# Patient Record
Sex: FEMALE
[De-identification: names, ages, dates, MRNs, and addresses within clinical notes are randomized per-mention and may not be internally consistent; named-entity substitution may affect disease eponyms.]

---

## 2023-01-25 ENCOUNTER — HOSPITAL ENCOUNTER (EMERGENCY)
Dept: HOSPITAL 97 - ER | Age: 23
Discharge: HOME | End: 2023-01-25
Payer: SELF-PAY

## 2023-01-25 VITALS — TEMPERATURE: 98.6 F | OXYGEN SATURATION: 100 % | SYSTOLIC BLOOD PRESSURE: 122 MMHG | DIASTOLIC BLOOD PRESSURE: 63 MMHG

## 2023-01-25 DIAGNOSIS — R10.30: Primary | ICD-10-CM

## 2023-01-25 DIAGNOSIS — R11.2: ICD-10-CM

## 2023-01-25 LAB
ALBUMIN SERPL BCP-MCNC: 3.5 G/DL (ref 3.4–5)
ALP SERPL-CCNC: 60 U/L (ref 45–117)
ALT SERPL W P-5'-P-CCNC: 20 U/L (ref 13–56)
AST SERPL W P-5'-P-CCNC: 14 U/L (ref 15–37)
BUN BLD-MCNC: 13 MG/DL (ref 7–18)
GLUCOSE SERPLBLD-MCNC: 103 MG/DL (ref 74–106)
HCT VFR BLD CALC: 35.3 % (ref 36–45)
LIPASE SERPL-CCNC: 145 U/L (ref 73–393)
LYMPHOCYTES # SPEC AUTO: 1.7 K/UL (ref 0.7–4.9)
MCV RBC: 82.4 FL (ref 80–100)
PMV BLD: 8.1 FL (ref 7.6–11.3)
POTASSIUM SERPL-SCNC: 3.9 MMOL/L (ref 3.5–5.1)
RBC # BLD: 4.28 M/UL (ref 3.86–4.86)
SP GR UR: >1.03 (ref 1–1.03)

## 2023-01-25 PROCEDURE — 81003 URINALYSIS AUTO W/O SCOPE: CPT

## 2023-01-25 PROCEDURE — 81025 URINE PREGNANCY TEST: CPT

## 2023-01-25 PROCEDURE — 80053 COMPREHEN METABOLIC PANEL: CPT

## 2023-01-25 PROCEDURE — 85025 COMPLETE CBC W/AUTO DIFF WBC: CPT

## 2023-01-25 PROCEDURE — 36415 COLL VENOUS BLD VENIPUNCTURE: CPT

## 2023-01-25 PROCEDURE — 83690 ASSAY OF LIPASE: CPT

## 2023-01-25 PROCEDURE — 76830 TRANSVAGINAL US NON-OB: CPT

## 2023-01-25 PROCEDURE — 74177 CT ABD & PELVIS W/CONTRAST: CPT

## 2023-01-25 NOTE — ER
Nurse's Notes                                                                                     

 [facility]                                                                 

Name: Alanis Burns                                                                              

Age: 22 yrs                                                                                       

Sex: Female                                                                                       

: 2000                                                                                   

MRN: [id number]                                                                                   

Arrival Date: 2023                                                                          

Time: 09:12                                                                                       

Account#: [id number]                                                                            

Bed IW1                                                                                           

Private MD:                                                                                       

Diagnosis: Lower abdominal pain, unspecified                                                      

                                                                                                  

Presentation:                                                                                     

                                                                                             

09:40 Chief complaint: Patient states: lower abd pain and vomiting since last night.          iw  

      Coronavirus screen: At this time, the client does not indicate any symptoms associated      

      with coronavirus-19. Ebola Screen: Patient negative for fever greater than or equal to      

      101.5 degrees Fahrenheit, and additional compatible Ebola Virus Disease symptoms            

      Patient denies exposure to infectious person. Patient denies travel to an                   

      Ebola-affected area in the 21 days before illness onset. No symptoms or risks               

      identified at this time. Initial Sepsis Screen: Does the patient meet any 2 criteria?       

      No. Patient's initial sepsis screen is negative. Does the patient have a suspected          

      source of infection? No. Patient's initial sepsis screen is negative. Risk Assessment:      

      Do you want to hurt yourself or someone else? Patient reports no desire to harm self or     

      others. Onset of symptoms was 2023.                                             

09:40 Method Of Arrival: Ambulatory                                                           iw  

09:40 Acuity: MIKHAIL 3                                                                           iw  

                                                                                                  

OB/GYN:                                                                                           

09:41 LMP 2022                                                                          iw  

                                                                                                  

Historical:                                                                                       

- Allergies:                                                                                      

09:41 No Known Allergies;                                                                     iw  

- Home Meds:                                                                                      

09:41 None [Active];                                                                          iw  

- PMHx:                                                                                           

09:41 None;                                                                                   iw  

                                                                                                  

                                                                                                  

                                                                                                  

Assessment:                                                                                       

12:36 Reassessment: pt not in lobby.                                                          iw  

                                                                                                  

Vital Signs:                                                                                      

09:40  / 63; Pulse 84; Resp 16; Temp 98.6; Pulse Ox 100% on R/A; Weight 63.5 kg; Height iw  

      5 ft. (152.40 cm);                                                                          

09:40 Body Mass Index 27.34 (63.50 kg, 152.40 cm)                                             iw  

                                                                                                  

ED Course:                                                                                        

09:12 Patient arrived in ED.                                                                  mr  

09:16 Jorge Ross PA is PHCP.                                                              m 

09:16 Jakub Kang MD is Attending Physician.                                            jmm 

09:41 Triage completed.                                                                       iw  

09:50 Inserted saline lock: 22 gauge in right antecubital area, using aseptic technique.      iw  

10:51 CT Abd/Pelvis - IV Contrast Only In Process Unspecified.                                EDMS

11:38 US Transvaginal Study (Probe) In Process Unspecified.                                   EDMS

14:01 Shirlene Mueller, RN is Primary Nurse.                                                   iw  

                                                                                                  

Administered Medications:                                                                         

10:03 Drug: Zofran (Ondansetron) 4 mg Route: IVP; Site: left antecubital;                     jl7 

                                                                                                  

                                                                                                  

Outcome:                                                                                          

13:59 Discharge ordered by MD.                                                                jose carlos 

14:01 Patient left the ED.                                                                    iw  

                                                                                                  

Signatures:                                                                                       

Dispatcher [employer]                           EDMS                                                 

Jorge Ross PA                       PA   [facility]                                                  

Victoria Restrepo                                 mr                                                   

Shirlene Mueller, RN                     RN   Danny Turner RN                        RN   jl7                                                  

                                                                                                  

**************************************************************************************************

## 2023-01-25 NOTE — EDPHYS
Physician Documentation                                                                           

 [facility]                                                                 

Name: Alanis Burns                                                                              

Age: 22 yrs                                                                                       

Sex: Female                                                                                       

: 2000                                                                                   

MRN: [id number]                                                                                   

Arrival Date: 2023                                                                          

Time: 09:12                                                                                       

Account#: [id number]                                                                            

Bed IW1                                                                                           

Private MD:                                                                                       

ED Physician Jakub Kang                                                                     

HPI:                                                                                              

                                                                                             

09:41 This 22 yrs old  Female presents to ER via Ambulatory with complaints of        jmm 

      Abdominal Pain, Vomiting.                                                                   

09:41 The patient presents with abdominal pain. Onset: The symptoms/episode began/occurred    jmm 

      gradually, yesterday. The symptoms do not radiate. Associated signs and symptoms:           

      Pertinent positives: nausea and vomiting. The symptoms are described as achy, sharp.        

      Modifying factors: The symptoms are alleviated by nothing, the symptoms are aggravated      

      by nothing. The patient has not experienced similar symptoms in the past. Is a              

      22-year-old female with no chronic medical conditions the presents emerged department       

      with complaints of lower abdominal pain and vomiting. Denies any diarrhea. Denies           

      fever. Denies any recent antibiotic use. Denies any recent travel..                         

                                                                                                  

OB/GYN:                                                                                           

09:41 LMP 2022                                                                          iw  

                                                                                                  

Historical:                                                                                       

- Allergies:                                                                                      

09:41 No Known Allergies;                                                                     iw  

- Home Meds:                                                                                      

09:41 None [Active];                                                                          iw  

- PMHx:                                                                                           

09:41 None;                                                                                   iw  

                                                                                                  

                                                                                                  

                                                                                                  

ROS:                                                                                              

09:41 Constitutional: Negative for fever, chills, and weight loss, Cardiovascular: Negative   jmm 

      for chest pain, palpitations, and edema, Respiratory: Negative for shortness of breath,     

      cough, wheezing, and pleuritic chest pain.                                                  

09:41 Abdomen/GI: Positive for abdominal pain, nausea and vomiting.                               

09:41 All other systems are negative.                                                             

                                                                                                  

Exam:                                                                                             

09:41 Constitutional:  This is a well developed, well nourished patient who is awake, alert,  jmm 

      and in no acute distress. Head/Face:  atraumatic. Eyes:  EOMI, no conjunctival erythema     

      appreciated ENT:  Moist Mucus Membranes Neck:  Trachea midline, Supple Chest/axilla:        

      Normal chest wall appearance and motion.   Cardiovascular:  Regular rate and rhythm.        

      No edema appreciated Respiratory:  Normal respirations, no respiratory distress             

      appreciated                                                                                 

09:41 Back:  Normal ROM Skin:  General appearance color normal MS/ Extremity:  Moves all          

      extremities, no obvious deformities appreciated, no edema noted to the lower                

      extremities  Neuro:  Awake and alert Psych:  Behavior is normal, Mood is normal,            

      Patient is cooperative and pleasant                                                         

09:41 Abdomen/GI: Inspection: abdomen appears normal, Bowel sounds: normal, Palpation: soft,      

      mild abdominal tenderness, in all quadrants.                                                

                                                                                                  

Vital Signs:                                                                                      

09:40  / 63; Pulse 84; Resp 16; Temp 98.6; Pulse Ox 100% on R/A; Weight 63.5 kg; Height iw  

      5 ft. (152.40 cm);                                                                          

09:40 Body Mass Index 27.34 (63.50 kg, 152.40 cm)                                             iw  

                                                                                                  

MDM:                                                                                              

09:41 Patient medically screened.                                                             [facility] 

13:42 Data reviewed: vital signs, nurses notes. Counseling: I had a detailed discussion with  [facility] 

      the patient and/or guardian regarding: lab results, radiology results. ED course:           

      Patient eloped from the ED prior to final disposition.                                      

                                                                                                  

                                                                                             

09:44 Order name: CBC with Diff; Complete Time: 10:24                                         [facility] 

                                                                                             

09:44 Order name: CMP; Complete Time: 10:35                                                   [facility] 

                                                                                             

09:44 Order name: Lipase; Complete Time: 10:35                                                [facility] 

                                                                                             

09:44 Order name: CT Abd/Pelvis - IV Contrast Only; Complete Time: 11:09                      [facility] 

                                                                                             

10:10 Order name: Urine Dipstick-Ancillary                                                    [facility]

                                                                                             

10:13 Order name: Urine Pregnancy--Ancillary (enter results); Complete Time: 10:35              

                                                                                             

09:44 Order name: IV Saline Lock; Complete Time: 09:50                                        [facility] 

                                                                                             

09:44 Order name: Labs collected and sent; Complete Time: 09:50                               [facility] 

                                                                                             

09:44 Order name: Urine Dipstick-Ancillary (obtain specimen); Complete Time: 10:03            [facility] 

                                                                                             

09:44 Order name: Urine Pregnancy Test (obtain specimen); Complete Time: 10:03                [facility] 

                                                                                             

11:11 Order name: US Transvaginal Study (Probe); Complete Time: 12:13                         [facility] 

                                                                                             

12:20 Order name: Pelvic Exam Setup                                                           [facility] 

                                                                                                  

Administered Medications:                                                                         

10:03 Drug: Zofran (Ondansetron) 4 mg Route: IVP; Site: left antecubital;                     jl7 

                                                                                                  

                                                                                                  

Disposition:                                                                                      

14:35 Co-signature as Attending Physician, Jakub Kang MD I reviewed the patient's care   rt  

      provided by the Advanced Practice Provider and agree with the diagnosis and treatment       

      plan.                                                                                       

                                                                                                  

Disposition Summary:                                                                              

23 13:59                                                                                    

Discharge Ordered                                                                                 

      Location: Home                                                                          [facility] 

      Condition: Stable                                                                       [facility] 

      Diagnosis                                                                                   

        - Lower abdominal pain, unspecified                                                   [facility] 

      Followup:                                                                               jmm 

        - With: Private Physician                                                                  

        - When: 2 - 3 days                                                                         

        - Reason: Recheck today's complaints, Continuance of care, Re-evaluation by your           

      physician                                                                                   

      Discharge Instructions:                                                                     

        - Discharge Summary Sheet                                                             [facility] 

        - Abdominal Pain, Adult                                                               [facility] 

      Forms:                                                                                      

        - Medication Reconciliation Form                                                      [facility] 

        - Thank You Letter                                                                    [facility] 

        - Antibiotic Education                                                                [facility] 

        - Prescription Opioid Use                                                             [facility] 

Signatures:                                                                                       

Dispatcher [employer]                           EDMS                                                 

Jorge Ross PA PA jmm Williams, Irene RN                     RN   iw                                                   

Danny Rose RN                        RN   jl7                                                  

Jakub Kang MD MD   rt                                                   

                                                                                                  

Corrections: (The following items were deleted from the chart)                                    

 13:54 after being seen by provider jose carlos branch 

13:59 13:54 wait time jose carlos branch 

59 13:54 Abdominal pain, unspecified [facility] 

                                                                                                  

**************************************************************************************************

## 2023-01-25 NOTE — RAD REPORT
EXAM DESCRIPTION:  US - Transvaginal Study Probe - 1/25/2023 11:36 am

 

CLINICAL HISTORY:  Pelvic pain

 

COMPARISON:  CT abdomen January 25, 2023

 

FINDINGS:  The uterus measures 9 x 4 x 5 cm. A fibroid is not seen. The endometrial stripe measures 6
 millimeters

 

The right ovary measures 4.9 x 2.5 x 2.1 centimeters.

 

A tubular structure is present adjacent to the right ovary presumably a dilated fallopian tube.

 

Left ovary normal in size and echotexture. 2 centimeter exophytic ovarian cyst suspected.

 

Left adnexal unremarkable

 

Small amount of free fluid

 

IMPRESSION:  Right hydrosalpinx with small amount of free fluid

 

A 2 centimeter exophytic left ovarian cyst

## 2023-01-25 NOTE — RAD REPORT
EXAM DESCRIPTION:  CT - Abdomen   Pelvis W Contrast - 1/25/2023 10:49 am

 

CLINICAL HISTORY:  Abdominal pain

 

COMPARISON:  none.

 

TECHNIQUE:  Computed axial tomography of the abdomen pelvis was obtained. 100 cc Isovue-300 was admin
istered intravenously. Oral contrast was not requested which limits evaluation of bowel and appendix

 

All CT scans are performed using dose optimization technique as appropriate and may include automated
 exposure control or mA/KV adjustment according to patient size.

 

FINDINGS:  The liver, spleen, pancreas, adrenal and kidneys appear unremarkable.

 

There is no evidence of diverticulitis.

 

Normal appendix

 

Dilated fluid-filled tubular structures are present within each adnexa probably fallopian tubes. Smal
l amount of free fluid.

 

IMPRESSION:  Bilateral hydrosalpinx suspected. Further evaluation with ultrasound should helpful